# Patient Record
Sex: FEMALE | URBAN - METROPOLITAN AREA
[De-identification: names, ages, dates, MRNs, and addresses within clinical notes are randomized per-mention and may not be internally consistent; named-entity substitution may affect disease eponyms.]

---

## 2019-06-25 ENCOUNTER — APPOINTMENT (RX ONLY)
Dept: URBAN - METROPOLITAN AREA CLINIC 148 | Facility: CLINIC | Age: 54
Setting detail: DERMATOLOGY
End: 2019-06-25

## 2019-06-25 DIAGNOSIS — L81.4 OTHER MELANIN HYPERPIGMENTATION: ICD-10-CM

## 2019-06-25 DIAGNOSIS — L98.8 OTHER SPECIFIED DISORDERS OF THE SKIN AND SUBCUTANEOUS TISSUE: ICD-10-CM

## 2019-06-25 PROCEDURE — ? COUNSELING

## 2019-06-25 PROCEDURE — ? MEDICAL CONSULTATION: FILLERS

## 2019-06-25 PROCEDURE — ? ADDITIONAL NOTES

## 2019-06-25 NOTE — PROCEDURE: ADDITIONAL NOTES
Detail Level: Simple
Additional Notes: Recommend consult with Arianne
Additional Notes: Quote for 2 Lyft\\nQuote for 1 silk.

## 2020-09-01 ENCOUNTER — APPOINTMENT (RX ONLY)
Dept: URBAN - METROPOLITAN AREA CLINIC 148 | Facility: CLINIC | Age: 55
Setting detail: DERMATOLOGY
End: 2020-09-01

## 2020-09-01 VITALS — TEMPERATURE: 97.5 F

## 2020-09-01 DIAGNOSIS — L81.4 OTHER MELANIN HYPERPIGMENTATION: ICD-10-CM

## 2020-09-01 DIAGNOSIS — D22 MELANOCYTIC NEVI: ICD-10-CM

## 2020-09-01 DIAGNOSIS — L82.1 OTHER SEBORRHEIC KERATOSIS: ICD-10-CM

## 2020-09-01 PROBLEM — D22.62 MELANOCYTIC NEVI OF LEFT UPPER LIMB, INCLUDING SHOULDER: Status: ACTIVE | Noted: 2020-09-01

## 2020-09-01 PROBLEM — D22.122 MELANOCYTIC NEVI OF LEFT LOWER EYELID, INCLUDING CANTHUS: Status: ACTIVE | Noted: 2020-09-01

## 2020-09-01 PROBLEM — D22.61 MELANOCYTIC NEVI OF RIGHT UPPER LIMB, INCLUDING SHOULDER: Status: ACTIVE | Noted: 2020-09-01

## 2020-09-01 PROBLEM — D22.4 MELANOCYTIC NEVI OF SCALP AND NECK: Status: ACTIVE | Noted: 2020-09-01

## 2020-09-01 PROCEDURE — ? FULL BODY SKIN EXAM

## 2020-09-01 PROCEDURE — ? ADDITIONAL NOTES

## 2020-09-01 PROCEDURE — 99213 OFFICE O/P EST LOW 20 MIN: CPT

## 2020-09-01 PROCEDURE — ? COUNSELING

## 2020-09-01 PROCEDURE — ? OBSERVATION

## 2020-09-01 ASSESSMENT — LOCATION SIMPLE DESCRIPTION DERM
LOCATION SIMPLE: LEFT FOREARM
LOCATION SIMPLE: RIGHT POSTERIOR UPPER ARM
LOCATION SIMPLE: LEFT POSTERIOR UPPER ARM
LOCATION SIMPLE: LEFT ANTERIOR NECK
LOCATION SIMPLE: ABDOMEN
LOCATION SIMPLE: LEFT EYE
LOCATION SIMPLE: LEFT BREAST
LOCATION SIMPLE: RIGHT ANTERIOR NECK
LOCATION SIMPLE: RIGHT FOREARM
LOCATION SIMPLE: UPPER BACK
LOCATION SIMPLE: CHEST

## 2020-09-01 ASSESSMENT — LOCATION DETAILED DESCRIPTION DERM
LOCATION DETAILED: LEFT DISTAL DORSAL FOREARM
LOCATION DETAILED: RIGHT PROXIMAL DORSAL FOREARM
LOCATION DETAILED: LEFT INFERIOR ANTERIOR NECK
LOCATION DETAILED: EPIGASTRIC SKIN
LOCATION DETAILED: LEFT INFRAMAMMARY CREASE (INNER QUADRANT)
LOCATION DETAILED: RIGHT PROXIMAL POSTERIOR UPPER ARM
LOCATION DETAILED: SUPERIOR THORACIC SPINE
LOCATION DETAILED: LEFT PROXIMAL LATERAL POSTERIOR UPPER ARM
LOCATION DETAILED: UPPER STERNUM
LOCATION DETAILED: LEFT CARUNCULA
LOCATION DETAILED: RIGHT INFERIOR ANTERIOR NECK

## 2020-09-01 ASSESSMENT — LOCATION ZONE DERM
LOCATION ZONE: EYELID
LOCATION ZONE: TRUNK
LOCATION ZONE: ARM
LOCATION ZONE: NECK

## 2021-02-03 ENCOUNTER — OFFICE VISIT (OUTPATIENT)
Dept: URBAN - METROPOLITAN AREA CLINIC 60 | Facility: CLINIC | Age: 56
End: 2021-02-03

## 2021-02-23 ENCOUNTER — OFFICE VISIT (OUTPATIENT)
Dept: URBAN - METROPOLITAN AREA SURGERY CENTER 4 | Facility: SURGERY CENTER | Age: 56
End: 2021-02-23

## 2021-03-01 ENCOUNTER — OFFICE VISIT (OUTPATIENT)
Dept: URBAN - METROPOLITAN AREA SURGERY CENTER 4 | Facility: SURGERY CENTER | Age: 56
End: 2021-03-01

## 2021-03-09 ENCOUNTER — OFFICE VISIT (OUTPATIENT)
Dept: URBAN - METROPOLITAN AREA CLINIC 60 | Facility: CLINIC | Age: 56
End: 2021-03-09

## 2021-03-15 ENCOUNTER — OFFICE VISIT (OUTPATIENT)
Dept: URBAN - METROPOLITAN AREA CLINIC 60 | Facility: CLINIC | Age: 56
End: 2021-03-15

## 2021-09-07 ENCOUNTER — APPOINTMENT (RX ONLY)
Dept: URBAN - METROPOLITAN AREA CLINIC 148 | Facility: CLINIC | Age: 56
Setting detail: DERMATOLOGY
End: 2021-09-07

## 2021-09-07 DIAGNOSIS — D22 MELANOCYTIC NEVI: ICD-10-CM

## 2021-09-07 DIAGNOSIS — L81.4 OTHER MELANIN HYPERPIGMENTATION: ICD-10-CM

## 2021-09-07 DIAGNOSIS — L82.1 OTHER SEBORRHEIC KERATOSIS: ICD-10-CM

## 2021-09-07 PROBLEM — D22.5 MELANOCYTIC NEVI OF TRUNK: Status: ACTIVE | Noted: 2021-09-07

## 2021-09-07 PROBLEM — D22.71 MELANOCYTIC NEVI OF RIGHT LOWER LIMB, INCLUDING HIP: Status: ACTIVE | Noted: 2021-09-07

## 2021-09-07 PROBLEM — D22.72 MELANOCYTIC NEVI OF LEFT LOWER LIMB, INCLUDING HIP: Status: ACTIVE | Noted: 2021-09-07

## 2021-09-07 PROCEDURE — ? ADDITIONAL NOTES

## 2021-09-07 PROCEDURE — ? COUNSELING

## 2021-09-07 PROCEDURE — ? FULL BODY SKIN EXAM

## 2021-09-07 PROCEDURE — 99213 OFFICE O/P EST LOW 20 MIN: CPT

## 2021-09-07 PROCEDURE — ? SUNSCREEN RECOMMENDATIONS

## 2021-09-07 ASSESSMENT — LOCATION DETAILED DESCRIPTION DERM
LOCATION DETAILED: LEFT INFRAMAMMARY CREASE (INNER QUADRANT)
LOCATION DETAILED: NASAL DORSUM
LOCATION DETAILED: LEFT ANTERIOR DISTAL THIGH
LOCATION DETAILED: LEFT PROXIMAL LATERAL POSTERIOR UPPER ARM
LOCATION DETAILED: RIGHT SUPERIOR MEDIAL UPPER BACK
LOCATION DETAILED: RIGHT ANTERIOR DISTAL THIGH
LOCATION DETAILED: RIGHT INFERIOR MEDIAL UPPER BACK
LOCATION DETAILED: RIGHT PROXIMAL POSTERIOR UPPER ARM
LOCATION DETAILED: UPPER STERNUM

## 2021-09-07 ASSESSMENT — LOCATION ZONE DERM
LOCATION ZONE: NOSE
LOCATION ZONE: ARM
LOCATION ZONE: LEG
LOCATION ZONE: TRUNK

## 2021-09-07 ASSESSMENT — LOCATION SIMPLE DESCRIPTION DERM
LOCATION SIMPLE: RIGHT UPPER BACK
LOCATION SIMPLE: LEFT POSTERIOR UPPER ARM
LOCATION SIMPLE: RIGHT POSTERIOR UPPER ARM
LOCATION SIMPLE: LEFT THIGH
LOCATION SIMPLE: RIGHT THIGH
LOCATION SIMPLE: LEFT BREAST
LOCATION SIMPLE: CHEST
LOCATION SIMPLE: NOSE

## 2022-07-09 ENCOUNTER — TELEPHONE ENCOUNTER (OUTPATIENT)
Dept: URBAN - METROPOLITAN AREA CLINIC 121 | Facility: CLINIC | Age: 57
End: 2022-07-09

## 2022-07-09 RX ORDER — CYANOCOBALAMIN 1000 UG/ML
INJECTION INTRAMUSCULAR; SUBCUTANEOUS
Refills: 0 | OUTPATIENT
Start: 2020-10-28 | End: 2021-02-03

## 2022-07-09 RX ORDER — LEVOTHYROXINE LIOTHYRONINE 9; 38 UG/1; UG/1
TABLET ORAL
Refills: 0 | OUTPATIENT
Start: 2020-12-15 | End: 2021-02-03

## 2022-07-09 RX ORDER — MIRTAZAPINE 30 MG/1
TABLET, FILM COATED ORAL
Refills: 0 | OUTPATIENT
Start: 2020-10-28 | End: 2021-02-03

## 2022-07-10 ENCOUNTER — TELEPHONE ENCOUNTER (OUTPATIENT)
Dept: URBAN - METROPOLITAN AREA CLINIC 121 | Facility: CLINIC | Age: 57
End: 2022-07-10

## 2022-07-10 RX ORDER — MIRTAZAPINE 30 MG/1
TABLET, FILM COATED ORAL
Refills: 0 | Status: ACTIVE | COMMUNITY
Start: 2021-02-03

## 2022-07-10 RX ORDER — LEVOTHYROXINE LIOTHYRONINE 9; 38 UG/1; UG/1
TABLET ORAL
Refills: 0 | Status: ACTIVE | COMMUNITY
Start: 2021-02-03

## 2022-07-10 RX ORDER — CYANOCOBALAMIN 1000 UG/ML
INJECTION INTRAMUSCULAR; SUBCUTANEOUS
Refills: 0 | Status: ACTIVE | COMMUNITY
Start: 2021-02-03

## 2022-09-13 ENCOUNTER — APPOINTMENT (RX ONLY)
Dept: URBAN - METROPOLITAN AREA CLINIC 148 | Facility: CLINIC | Age: 57
Setting detail: DERMATOLOGY
End: 2022-09-13

## 2022-09-13 DIAGNOSIS — D18.0 HEMANGIOMA: ICD-10-CM

## 2022-09-13 DIAGNOSIS — D22 MELANOCYTIC NEVI: ICD-10-CM

## 2022-09-13 DIAGNOSIS — L72.8 OTHER FOLLICULAR CYSTS OF THE SKIN AND SUBCUTANEOUS TISSUE: ICD-10-CM

## 2022-09-13 DIAGNOSIS — L81.4 OTHER MELANIN HYPERPIGMENTATION: ICD-10-CM

## 2022-09-13 DIAGNOSIS — L82.1 OTHER SEBORRHEIC KERATOSIS: ICD-10-CM

## 2022-09-13 PROBLEM — D22.5 MELANOCYTIC NEVI OF TRUNK: Status: ACTIVE | Noted: 2022-09-13

## 2022-09-13 PROBLEM — D18.01 HEMANGIOMA OF SKIN AND SUBCUTANEOUS TISSUE: Status: ACTIVE | Noted: 2022-09-13

## 2022-09-13 PROCEDURE — ? SUNSCREEN RECOMMENDATIONS

## 2022-09-13 PROCEDURE — ? FULL BODY SKIN EXAM

## 2022-09-13 PROCEDURE — 99213 OFFICE O/P EST LOW 20 MIN: CPT

## 2022-09-13 PROCEDURE — ? COUNSELING

## 2022-09-13 PROCEDURE — ? DEFER

## 2022-09-13 ASSESSMENT — LOCATION DETAILED DESCRIPTION DERM
LOCATION DETAILED: RIGHT PROXIMAL DORSAL FOREARM
LOCATION DETAILED: RIGHT MEDIAL UPPER BACK
LOCATION DETAILED: LEFT PROXIMAL DORSAL FOREARM
LOCATION DETAILED: RIGHT ANTERIOR DISTAL THIGH
LOCATION DETAILED: LEFT MEDIAL UPPER BACK
LOCATION DETAILED: RIGHT PROXIMAL PRETIBIAL REGION
LOCATION DETAILED: LEFT PROXIMAL PRETIBIAL REGION
LOCATION DETAILED: PERIUMBILICAL SKIN
LOCATION DETAILED: LEFT MEDIAL SUPERIOR CHEST
LOCATION DETAILED: LEFT DISTAL DORSAL FOREARM
LOCATION DETAILED: SUBXIPHOID

## 2022-09-13 ASSESSMENT — LOCATION SIMPLE DESCRIPTION DERM
LOCATION SIMPLE: RIGHT THIGH
LOCATION SIMPLE: RIGHT UPPER BACK
LOCATION SIMPLE: RIGHT PRETIBIAL REGION
LOCATION SIMPLE: LEFT UPPER BACK
LOCATION SIMPLE: LEFT PRETIBIAL REGION
LOCATION SIMPLE: RIGHT FOREARM
LOCATION SIMPLE: ABDOMEN
LOCATION SIMPLE: CHEST
LOCATION SIMPLE: LEFT FOREARM

## 2022-09-13 ASSESSMENT — LOCATION ZONE DERM
LOCATION ZONE: LEG
LOCATION ZONE: TRUNK
LOCATION ZONE: ARM

## 2022-11-14 NOTE — PROCEDURE: DEFER
At [facility], one important tool we use to improve our patient services is our Patient Survey.  Following your visit you may receive our survey in the mail.    Please take the time to complete the survey.    If your visit with us was great, we want to hear about it.    If we can improve, please let us know how.       Get [facility] Pres[facility]    [facility] uses the same medical record your doctor uses. More accurate and timely information for your doctor and your pharmacy means more effective and safer health care for you and your family.  [facility] accepts all of the major insurance plans which means you pay the same copay wherever you go.  [facility]ists take the time to explain your medication regimen to you.  [facility] will mail your medications to you free of postage and handling charges. We love inez.      
Introduction Text (Please End With A Colon): The following procedure was deferred:
Size Of Lesion In Cm (Optional): 0
Detail Level: Simple
Scheduling Instructions (Optional): Dr. Garza or Dr. Hull
Procedure To Be Performed At Next Visit: Excision

## 2023-09-18 ENCOUNTER — APPOINTMENT (RX ONLY)
Dept: URBAN - METROPOLITAN AREA CLINIC 148 | Facility: CLINIC | Age: 58
Setting detail: DERMATOLOGY
End: 2023-09-18

## 2023-09-18 DIAGNOSIS — L81.4 OTHER MELANIN HYPERPIGMENTATION: ICD-10-CM

## 2023-09-18 DIAGNOSIS — D18.0 HEMANGIOMA: ICD-10-CM

## 2023-09-18 DIAGNOSIS — D22 MELANOCYTIC NEVI: ICD-10-CM

## 2023-09-18 DIAGNOSIS — L82.1 OTHER SEBORRHEIC KERATOSIS: ICD-10-CM

## 2023-09-18 PROBLEM — D22.5 MELANOCYTIC NEVI OF TRUNK: Status: ACTIVE | Noted: 2023-09-18

## 2023-09-18 PROBLEM — D18.01 HEMANGIOMA OF SKIN AND SUBCUTANEOUS TISSUE: Status: ACTIVE | Noted: 2023-09-18

## 2023-09-18 PROBLEM — D22.39 MELANOCYTIC NEVI OF OTHER PARTS OF FACE: Status: ACTIVE | Noted: 2023-09-18

## 2023-09-18 PROCEDURE — 99213 OFFICE O/P EST LOW 20 MIN: CPT

## 2023-09-18 PROCEDURE — ? SUNSCREEN RECOMMENDATIONS

## 2023-09-18 PROCEDURE — ? FULL BODY SKIN EXAM

## 2023-09-18 PROCEDURE — ? COUNSELING

## 2023-09-18 ASSESSMENT — LOCATION DETAILED DESCRIPTION DERM
LOCATION DETAILED: RIGHT SUPERIOR UPPER BACK
LOCATION DETAILED: LEFT PROXIMAL DORSAL FOREARM
LOCATION DETAILED: LEFT CLAVICULAR SKIN
LOCATION DETAILED: EPIGASTRIC SKIN
LOCATION DETAILED: LEFT LATERAL SUPERIOR CHEST
LOCATION DETAILED: RIGHT MID-UPPER BACK
LOCATION DETAILED: NASAL DORSUM
LOCATION DETAILED: LEFT CLAVICULAR NECK
LOCATION DETAILED: RIGHT PROXIMAL DORSAL FOREARM

## 2023-09-18 ASSESSMENT — LOCATION SIMPLE DESCRIPTION DERM
LOCATION SIMPLE: LEFT FOREARM
LOCATION SIMPLE: ABDOMEN
LOCATION SIMPLE: NOSE
LOCATION SIMPLE: RIGHT UPPER BACK
LOCATION SIMPLE: RIGHT FOREARM
LOCATION SIMPLE: LEFT CLAVICULAR SKIN
LOCATION SIMPLE: CHEST
LOCATION SIMPLE: LEFT ANTERIOR NECK

## 2023-09-18 ASSESSMENT — LOCATION ZONE DERM
LOCATION ZONE: NECK
LOCATION ZONE: ARM
LOCATION ZONE: TRUNK
LOCATION ZONE: NOSE

## 2024-04-10 ENCOUNTER — LAB (OUTPATIENT)
Dept: URBAN - METROPOLITAN AREA CLINIC 60 | Facility: CLINIC | Age: 59
End: 2024-04-10

## 2024-04-10 ENCOUNTER — OV NP (OUTPATIENT)
Dept: URBAN - METROPOLITAN AREA CLINIC 60 | Facility: CLINIC | Age: 59
End: 2024-04-10
Payer: COMMERCIAL

## 2024-04-10 VITALS
OXYGEN SATURATION: 100 % | WEIGHT: 127.2 LBS | DIASTOLIC BLOOD PRESSURE: 68 MMHG | HEART RATE: 89 BPM | HEIGHT: 62 IN | BODY MASS INDEX: 23.41 KG/M2 | RESPIRATION RATE: 12 BRPM | TEMPERATURE: 98.8 F | SYSTOLIC BLOOD PRESSURE: 120 MMHG

## 2024-04-10 DIAGNOSIS — Z12.11 SCREENING FOR MALIGNANT NEOPLASM OF COLON: ICD-10-CM

## 2024-04-10 DIAGNOSIS — R14.0 ABDOMINAL BLOATING: ICD-10-CM

## 2024-04-10 PROCEDURE — 99204 OFFICE O/P NEW MOD 45 MIN: CPT | Performed by: PHYSICIAN ASSISTANT

## 2024-04-10 RX ORDER — ZOLPIDEM TARTRATE 10 MG/1
TAKE ONE TABLET BY MOUTH ONE TIME DAILY TABLET ORAL
Qty: 30 UNSPECIFIED | Refills: 0 | Status: ACTIVE | COMMUNITY

## 2024-04-10 RX ORDER — BACLOFEN 5 MG/1
TAKE ONE TABLET BY MOUTH THREE TIMES A DAY AS NEEDED FOR 30 DAYS TABLET ORAL
Qty: 70 UNSPECIFIED | Refills: 0 | Status: ACTIVE | COMMUNITY

## 2024-04-10 RX ORDER — ESTRADIOL 0.1 MG/G
INSERT ONE GRAM VAGINALLY TWICE A WEEK AT BEDTIME CREAM VAGINAL
Qty: 42.5 UNSPECIFIED | Refills: 1 | Status: ACTIVE | COMMUNITY

## 2024-04-10 RX ORDER — TAMSULOSIN HYDROCHLORIDE 0.4 MG/1
1 CAPSULE CAPSULE ORAL ONCE A DAY
Status: ACTIVE | COMMUNITY

## 2024-04-10 RX ORDER — MIRTAZAPINE 30 MG/1
TABLET, FILM COATED ORAL
Qty: 90 TABLET | Refills: 0 | Status: ACTIVE | COMMUNITY

## 2024-04-10 RX ORDER — ROSUVASTATIN CALCIUM 5 MG/1
TAKE ONE TABLET BY MOUTH ONE TIME DAILY TABLET, COATED ORAL
Qty: 90 UNSPECIFIED | Refills: 1 | Status: ACTIVE | COMMUNITY

## 2024-04-10 RX ORDER — DIAZEPAM 2.5 MG/.5ML
AS DIRECTED GEL RECTAL
Status: ACTIVE | COMMUNITY

## 2024-04-10 NOTE — HPI-TODAY'S VISIT:
58-year-old female with anxiety and hyperlipidemia is referred to the office for abdominal bloating and gas.  She is a former patient of Dr. Landrum.  She was last seen in the office in 2021 for colonoscopy after having a positive stool Cologuard test. Colonoscopy was normal at that time.  Results summarized below.  She had a CT scan of the abdomen and pelvis without contrast on March 7, 2024 to evaluate lower abdominal/pelvic pain.  Her CT demonstrated nonspecific bulkiness of the lower uterine segment/cervix without discrete mass, evaluation was limited and pelvic ultrasound and clinical exam was recommended.  There was nonobstructive bilateral nephrolithiasis, with much greater stone burden on the right as compared to the left, the largest calculus measuring up to 5 mm on the right and 3 mm on the left.  CT was otherwise normal.  Pelvic ultrasound was done March 13, 2024 which demonstrated abnormal appearance of the cervix which appears enlarged.  Further evaluation with direct visualization or MRI was recommended.  The ovaries were not visualized.  There was a nonspecific cystic lesion in the vaginal canal measuring up to 10 mm. She states she had GYN exam which was unremarkable.   She presents to the office today reporting increased abdominal bloating and gas over the past 3 months. She has a history of pelvic floor disorder and follows with physical therapy. Her physical therapist told her that she might have IBS. She states her PCP told her she might have constipation. She states she has been using a lot of OTC gas reducers lately. She is not having any abdominal pain but the bloating is uncomfortable. She usually will feel bloating after dinner. She states her bowel habits are regular. She has 1 formed stool every day. She does not feel like she has any constipation issues. She denies any diarrhea, melena, or hematochezia. She reports a family history of lactose intolerance in her mother and her son. She has not tried any diet modifications. She states labs were done through her PCP recently which were normal. She has no other GI complaints and denies any pyrosis, dysphagia, odynophagia, nausea, vomiting, unintentional weight loss. No prior EGD. No NSAID use.   Colonoscopy 3/1/2021:Normal colonoscopy to the cecum with the exception of internal hemorrhoids.  Repeat colonoscopy was recommended in 10 years.

## 2024-05-10 ENCOUNTER — OFFICE VISIT (OUTPATIENT)
Dept: URBAN - METROPOLITAN AREA CLINIC 60 | Facility: CLINIC | Age: 59
End: 2024-05-10

## 2024-07-30 NOTE — HPI: FULL BODY SKIN EXAMINATION
What Type Of Note Output Would You Prefer (Optional)?: Standard Output
Yash Tj    February 5, 2018    Pain Management Follow Up    CHIEF COMPLAINT:  Chief Complaint   Patient presents with   â¢ Follow-up       HISTORY OF PRESENT ILLNESS:  The patient is a 63year old female followed by the pain clinic for multiple pain complaints. Patient is primary complaint is of chronic low back pain and intermittent hip and lower extremity pain. Patient's last seen on 12/5/2017 which she underwent a trochanteric bursa injection. She returns clinic today describing exacerbation following a fall. Patient reports she had flulike symptoms and suffered a fall landing on her buttock. Patient states she was weak and was unable to get up necessitating calling 911. She is treated and released from [facility] on 1/28/2018. She reports exacerbation of lower back pain following her hospitalization and fall. She continues describe stimulation pattern from her spinal cord stimulator covering the appropriate areas. She had been expressing fever and chills which is since resolved but she still describes feeling fatigued with greater dyspnea. Â· Severity - Current is 10/10; best is 7/10; worst is 10/10.   Â· Sitting tolerance - 30-60 minutes  Â· Walking tolerance - 1-10 minutes  Â· Any new numbness or weakness in your limbs since your last visit with Dr. Caleb Ferguson - Yes  Â· Any new medical condition since your last visit with Dr. Caleb Ferguson - Yes  Â· Any significant change in your lifestyle since your last visit with Dr. Caleb Ferguson - No  Â· Any new treatment/x-rays/MRI since your last visit with Dr. Caleb Ferguson - Yes  Â· Any sleep disturbance - No  Â· Any aberrant behavior - No  Â· Any positive urine drug screen-  No  Â· Pill count performed - No;     ALLERGIES:   Allergen Reactions   â¢ Pce [Erythromycin Base]      Unknown reaction   â¢ Penicillins HIVES and RASH   â¢ Sulfa Drugs Cross Reactors HIVES     Unknown severity   â¢ Tape [Adhesive] RASH     Reaction to surgical tape but tegaderm and paper tape ok
MEDICATIONS:    Current Outpatient Prescriptions on File Prior to Encounter:  metformin (GLUCOPHAGE-XR) 500 MG 24 hr tablet   gabapentin (NEURONTIN) 300 MG capsule   montelukast (SINGULAIR) 10 MG tablet   ketoconazole (NIZORAL) 2 % shampoo   NEXIUM 40 MG capsule   ibuprofen (MOTRIN) 800 MG tablet   [DISCONTINUED] clonazePAM (KLONOPIN) 1 MG tablet   traZODone (DESYREL) 100 MG tablet   divalproex (DEPAKOTE ER) 500 MG 24 hr ER tablet   baclofen (LIORESAL) 10 MG tablet   albuterol (PROAIR HFA) 108 (90 Base) MCG/ACT inhaler   folic acid (FOLATE) 120 MCG tablet   Linaclotide (LINZESS PO)   rosuvastatin (CRESTOR) 5 MG tablet   DYMISTA 137-50 MCG/ACT Suspension   escitalopram (LEXAPRO) 20 MG tablet   Elastic Bandages & Supports (LUMBAR BACK BRACE/SUPPORT PAD) Misc   Elastic Bandages & Supports (KNEE BRACE) Misc   LYRICA 150 MG capsule   metoCLOPramide (REGLAN) 10 MG tablet   lisinopril (ZESTRIL) 2.5 MG tablet   aspirin 325 MG tablet   diclofenac (VOLTAREN) 1 % gel   hydroxychloroquine (PLAQUENIL) 200 MG tablet   vitamin - therapeutic multivitamins w/minerals (CENTRUM SILVER,THERA-M) Tab   VIACTIV [phone] MG-UNT-MCG CHEW   Misc. Devices (FOLDING WALKER/ADULT) MISC   lubiprostone (AMITIZA) 8 MCG capsule   oxiconazole (OXISTAT) 1 % CREA     No current facility-administered medications on file prior to encounter. Past Medical History, Past Surgical History, Family History and Social History: Reviewed and updated in Epic. REVIEW OF SYSTEMS:  Pertinent positive ROS are check marked ([x]). All other ROS are negative.     Constitutional:  [x]  Chills  [x]  Fatigue  [x]  Fever  []  Insomnia  []  Weight Gain  []  Weight Loss    Musculoskeletal:  []  Joint Pain  []  Leg Pain  []  Arm Pain  [x]  Low Back Pain  [x]  Mid Back Pain  []  Neck Pain  []  Cold Feet  []  Varicose Veins   Neuro/Psychiatric:   []  Anxiety  []  Depression  [x]  Fainting  [x]  Headache  [x]  Clumsiness  []  Memory Loss  []  Seizures
What Is The Reason For Today's Visit?: Full Body Skin Examination with No Concerns
PHYSICAL EXAMINATION:  VITALS:    Visit Vitals  /76 (BP Location: [facility], Patient Position: Sitting)   Pulse 93   Temp 98.2 Â°F (36.8 Â°C) (Temporal Artery)   Wt 85.3 kg   LMP 08/23/1987   SpO2 94%   BMI 33.30 kg/mÂ²       GENERAL: Reveals a well-developed, obese female who is alert and oriented times 3 sitting comfortably in exam room in no apparent distress. Patient slightly disheveled. Spiering fatigue in a wheelchair. HEENT: Pupils neither dilated nor constricted for room light. SKIN: Warm, dry and intact. There are no rashes, lesions or ulcerations noted. EXTREMITIES: No clubbing, cyanosis or edema noted. MUSCULOSKELETAL: *Increased tenderness involving the lumbar sacral junction on the right. Some mild diffuse lumbar paravertebral tenderness. Persistent tender points in all 4 quadrants. Positive thigh thrust and Roverto's test on the right negative Gaenslen's test. Mildly positive Roverto's test on the left. Negative thigh thrust and Gaenslen's test  NEUROLOGIC: Alert and oriented Ã3. Speech fluent. Deep tendon reflexes are hypoactive but equal symmetrical at the patella and Achilles. No clonus present. Negative seated and supine straight leg raises. Patient transitions slowly demonstrating unsteady gait    DIAGNOSTIC DATA:  MRI of the right knee dated 1/17/2018 demonstrated moderate patellofemoral degenerative and mild chondromalacia of the medial compartment changes    Chest x-ray report describes stable thoracic neurostimulator epidural leads    DISCUSSION:  I reinforced first the importance of of safety avoiding falls and potential risk of lead migration with falls. I recommended utilizing ambulatory assist device if she feels unstable. I also the same time encouraged her to maintain movement and activity. IMPRESSION:  1. Facet arthropathy, lumbosacral    2. Foraminal stenosis of lumbosacral region    3. Lumbar degenerative disc disease    4.  Chronic bilateral low back pain without sciatica
What Is The Reason For Today's Visit? (Being Monitored For X): concerning skin lesions on an annual basis
[de-identified] : X-Ray coccyx radiographs were ordered, obtained, and independently reviewed in clinic on 07/29/24 depicting Signs of interval healing noted of minimally displaced coccygeal fracture  Outside images from [facility] 7/4/24 of the sacrum and coccyx demonstrate no acute fracture or osseous abnormalities per report, + radiolucency noted at coccyx possible fracture
5. SI (sacroiliac) joint dysfunction      Exacerbation of low back pain following fall with isolation to the right SI joint    RECOMMENDATIONS:  No orders of the defined types were placed in this encounter. New Prescriptions    No medications on file     Return for Sacroilliac injection(R). Â· The patient is encouraged to continue home stretching and exercise regimen as previously outlined in therapy and clinic. Â· Brief Pain Inventory assessment was performed, patient is exhibiting moderate disability score 53/70 reporting 50% responsiveness to current medication regimen in last 24 hours.     Moncho Melgoza MD  [facility]
How Severe Are Your Spot(S)?: mild

## 2024-12-02 ENCOUNTER — APPOINTMENT (RX ONLY)
Dept: URBAN - METROPOLITAN AREA CLINIC 148 | Facility: CLINIC | Age: 59
Setting detail: DERMATOLOGY
End: 2024-12-02

## 2024-12-02 DIAGNOSIS — L738 OTHER SPECIFIED DISEASES OF HAIR AND HAIR FOLLICLES: ICD-10-CM

## 2024-12-02 DIAGNOSIS — L663 OTHER SPECIFIED DISEASES OF HAIR AND HAIR FOLLICLES: ICD-10-CM

## 2024-12-02 DIAGNOSIS — D22 MELANOCYTIC NEVI: ICD-10-CM

## 2024-12-02 DIAGNOSIS — L82.1 OTHER SEBORRHEIC KERATOSIS: ICD-10-CM

## 2024-12-02 DIAGNOSIS — L73.9 FOLLICULAR DISORDER, UNSPECIFIED: ICD-10-CM

## 2024-12-02 DIAGNOSIS — L81.4 OTHER MELANIN HYPERPIGMENTATION: ICD-10-CM

## 2024-12-02 DIAGNOSIS — D18.0 HEMANGIOMA: ICD-10-CM

## 2024-12-02 PROBLEM — D22.39 MELANOCYTIC NEVI OF OTHER PARTS OF FACE: Status: ACTIVE | Noted: 2024-12-02

## 2024-12-02 PROBLEM — D22.5 MELANOCYTIC NEVI OF TRUNK: Status: ACTIVE | Noted: 2024-12-02

## 2024-12-02 PROBLEM — L02.32 FURUNCLE OF BUTTOCK: Status: ACTIVE | Noted: 2024-12-02

## 2024-12-02 PROBLEM — D18.01 HEMANGIOMA OF SKIN AND SUBCUTANEOUS TISSUE: Status: ACTIVE | Noted: 2024-12-02

## 2024-12-02 PROCEDURE — ? PRESCRIPTION

## 2024-12-02 PROCEDURE — ? SUNSCREEN RECOMMENDATIONS

## 2024-12-02 PROCEDURE — ? PRESCRIPTION MEDICATION MANAGEMENT

## 2024-12-02 PROCEDURE — 99214 OFFICE O/P EST MOD 30 MIN: CPT

## 2024-12-02 PROCEDURE — ? FULL BODY SKIN EXAM

## 2024-12-02 PROCEDURE — ? COUNSELING

## 2024-12-02 RX ORDER — CLINDAMYCIN PHOSPHATE 10 MG/ML
LOTION TOPICAL
Qty: 60 | Refills: 3 | Status: ERX | COMMUNITY
Start: 2024-12-02

## 2024-12-02 RX ADMIN — CLINDAMYCIN PHOSPHATE: 10 LOTION TOPICAL at 00:00

## 2024-12-02 ASSESSMENT — LOCATION SIMPLE DESCRIPTION DERM
LOCATION SIMPLE: CHEST
LOCATION SIMPLE: NOSE
LOCATION SIMPLE: LEFT BUTTOCK
LOCATION SIMPLE: ABDOMEN
LOCATION SIMPLE: LEFT CLAVICULAR SKIN
LOCATION SIMPLE: LEFT SCALP
LOCATION SIMPLE: RIGHT FOREARM
LOCATION SIMPLE: LEFT FOREARM
LOCATION SIMPLE: RIGHT UPPER BACK
LOCATION SIMPLE: LEFT ANTERIOR NECK

## 2024-12-02 ASSESSMENT — LOCATION ZONE DERM
LOCATION ZONE: ARM
LOCATION ZONE: SCALP
LOCATION ZONE: NOSE
LOCATION ZONE: NECK
LOCATION ZONE: TRUNK

## 2024-12-02 ASSESSMENT — LOCATION DETAILED DESCRIPTION DERM
LOCATION DETAILED: LEFT CLAVICULAR NECK
LOCATION DETAILED: RIGHT SUPERIOR UPPER BACK
LOCATION DETAILED: EPIGASTRIC SKIN
LOCATION DETAILED: RIGHT MID-UPPER BACK
LOCATION DETAILED: LEFT BUTTOCK
LOCATION DETAILED: NASAL DORSUM
LOCATION DETAILED: RIGHT PROXIMAL DORSAL FOREARM
LOCATION DETAILED: LEFT CLAVICULAR SKIN
LOCATION DETAILED: LEFT LATERAL SUPERIOR CHEST
LOCATION DETAILED: LEFT MEDIAL FRONTAL SCALP
LOCATION DETAILED: LEFT PROXIMAL DORSAL FOREARM